# Patient Record
Sex: MALE | Race: BLACK OR AFRICAN AMERICAN | Employment: FULL TIME | URBAN - METROPOLITAN AREA
[De-identification: names, ages, dates, MRNs, and addresses within clinical notes are randomized per-mention and may not be internally consistent; named-entity substitution may affect disease eponyms.]

---

## 2018-02-24 ENCOUNTER — APPOINTMENT (OUTPATIENT)
Dept: CT IMAGING | Age: 43
End: 2018-02-24
Attending: EMERGENCY MEDICINE
Payer: SELF-PAY

## 2018-02-24 ENCOUNTER — HOSPITAL ENCOUNTER (EMERGENCY)
Age: 43
Discharge: HOME | End: 2018-02-24
Attending: EMERGENCY MEDICINE
Payer: SELF-PAY

## 2018-02-24 ENCOUNTER — APPOINTMENT (OUTPATIENT)
Dept: GENERAL RADIOLOGY | Age: 43
End: 2018-02-24
Attending: EMERGENCY MEDICINE
Payer: SELF-PAY

## 2018-02-24 VITALS
OXYGEN SATURATION: 97 % | RESPIRATION RATE: 16 BRPM | HEART RATE: 102 BPM | TEMPERATURE: 98 F | SYSTOLIC BLOOD PRESSURE: 126 MMHG | DIASTOLIC BLOOD PRESSURE: 72 MMHG

## 2018-02-24 DIAGNOSIS — S09.90XA INJURY OF HEAD, INITIAL ENCOUNTER: Primary | ICD-10-CM

## 2018-02-24 PROCEDURE — 73600 X-RAY EXAM OF ANKLE: CPT

## 2018-02-24 PROCEDURE — 75810000293 HC SIMP/SUPERF WND  RPR

## 2018-02-24 PROCEDURE — 70450 CT HEAD/BRAIN W/O DYE: CPT

## 2018-02-24 PROCEDURE — 77030031132 HC SUT NYL COVD -A

## 2018-02-24 PROCEDURE — 99283 EMERGENCY DEPT VISIT LOW MDM: CPT

## 2018-02-24 NOTE — DISCHARGE INSTRUCTIONS
Learning About a Closed Head Injury  What is a closed head injury? A closed head injury happens when your head gets hit hard. The strong force of the blow causes your brain to shake in your skull. This movement can cause the brain to bruise, swell, or tear. Sometimes nerves or blood vessels also get damaged. This can cause bleeding in or around the brain. A concussion is a type of closed head injury. What are the symptoms? If you have a mild concussion, you may have a mild headache or feel \"not quite right. \" These symptoms are common. They usually go away over a few days to 4 weeks. But sometimes after a concussion, you feel like you can't function as well as before the injury. And you have new symptoms. This is called postconcussive syndrome. You may:  · Find it harder to solve problems, think, concentrate, or remember. · Have headaches. · Have changes in your sleep patterns, such as not being able to sleep or sleeping all the time. · Have changes in your personality. · Not be interested in your usual activities. · Feel angry or anxious without a clear reason. · Lose your sense of taste or smell. · Be dizzy, lightheaded, or unsteady. It may be hard to stand or walk. How is a closed head injury treated? Any person who may have a concussion needs to see a doctor. Some people have to stay in the hospital to be watched. Others can go home safely. If you go home, follow your doctor's instructions. He or she will tell you if you need someone to watch you closely for the next 24 hours or longer. Rest is the best treatment. Get plenty of sleep at night. And try to rest during the day. · Avoid activities that are physically or mentally demanding. These include housework, exercise, and schoolwork. And don't play video games, send text messages, or use the computer. You may need to change your school or work schedule to be able to avoid these activities.   · Ask your doctor when it's okay to drive, ride a bike, or operate machinery. · Take an over-the-counter pain medicine, such as acetaminophen (Tylenol), ibuprofen (Advil, Motrin), or naproxen (Aleve). Be safe with medicines. Read and follow all instructions on the label. · Check with your doctor before you use any other medicines for pain. · Do not drink alcohol or use illegal drugs. They can slow recovery. They can also increase your risk of getting a second head injury. Follow-up care is a key part of your treatment and safety. Be sure to make and go to all appointments, and call your doctor if you are having problems. It's also a good idea to know your test results and keep a list of the medicines you take. Where can you learn more? Go to http://akhil-pk.info/. Enter E235 in the search box to learn more about \"Learning About a Closed Head Injury. \"  Current as of: October 14, 2016  Content Version: 11.4  © 2171-6989 [employer]wise, Incorporated. Care instructions adapted under license by [employer] (which disclaims liability or warranty for this information). If you have questions about a medical condition or this instruction, always ask your healthcare professional. [address] any warranty or liability for your use of this information.
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
## 2018-09-03 ENCOUNTER — HOSPITAL ENCOUNTER (EMERGENCY)
Age: 43
Discharge: HOME | End: 2018-09-03
Attending: EMERGENCY MEDICINE
Payer: SELF-PAY

## 2018-09-03 VITALS
TEMPERATURE: 99.2 F | OXYGEN SATURATION: 98 % | DIASTOLIC BLOOD PRESSURE: 89 MMHG | SYSTOLIC BLOOD PRESSURE: 141 MMHG | BODY MASS INDEX: 28.93 KG/M2 | HEIGHT: 78 IN | WEIGHT: 250 LBS | HEART RATE: 89 BPM | RESPIRATION RATE: 20 BRPM

## 2018-09-03 DIAGNOSIS — Z48.02 ENCOUNTER FOR REMOVAL OF SUTURES: Primary | ICD-10-CM

## 2018-09-03 PROCEDURE — 75810000275 HC EMERGENCY DEPT VISIT NO LEVEL OF CARE

## 2018-09-03 NOTE — DISCHARGE INSTRUCTIONS
Learning About Stitches and Staples Removal  When are stitches and staples removed? Your doctor will tell you when to have your stitches or staples removed, usually in 7 to 14 days. How long you'll be told to wait will depend on things like where the wound is located, how big and how deep the wound is, and what your general health is like. Do not remove the stitches on your own. Stitches on the face are usually removed within a week. But stitches and staples on other areas of the body, such as on the back or belly or over a joint, may need to stay in place longer, often a week or two. Be sure to follow your doctor's instructions. How are stitches and staples removed? It usually doesn't hurt when the doctor removes the stitches or staples. You may feel a tug as each stitch or staple is removed. · You will either be seated or lying down. · To remove stitches, the doctor will use scissors to cut each of the knots and then pull the threads out. · To remove staples, the doctor will use a tool to take out the staples one at a time. · The area may still feel tender after the stitches or staples are gone. But it should feel better within a few minutes or up to a few hours. What can you expect after stitches and staples are removed? Depending on the type and location of the cut, you will have a scar. Scars usually fade over time. Keep the area clean, but you won't need a bandage. When should you call for help? Call your doctor now or seek immediate medical care if :  · You have new pain, or your pain gets worse. · You have trouble moving the area near the scar. · You have symptoms of infection, such as:  ¨ Increased pain, swelling, warmth, or redness around the scar. ¨ Red streaks leading from the scar. ¨ Pus draining from the scar. ¨ A fever. Watch closely for changes in your health, and be sure to contact your doctor if:  · The scar opens. · You do not get better as expected.   Follow-up care is a key part of your treatment and safety. Be sure to make and go to all appointments, and call your doctor if you do not get better as expected. It's also a good idea to keep a list of the medicines you take. Where can you learn more? Go to http://akhil-pk.info/. Enter W211 in the search box to learn more about \"Learning About Stitches and Staples Removal.\"  Current as of: November 20, 2017  Content Version: 11.7  © 9339-0963 [employer]. Care instructions adapted under license by [employer] (which disclaims liability or warranty for this information). If you have questions about a medical condition or this instruction, always ask your healthcare professional. [address] any warranty or liability for your use of this information.

## 2018-09-03 NOTE — ED PROVIDER NOTES
EMERGENCY DEPARTMENT HISTORY AND PHYSICAL EXAM 
 
11:54 AM 
 
 
Date: 9/3/2018 Patient Name: Sandra Day History of Presenting Illness Chief Complaint Patient presents with  Suture Removal  
  L scalp History Provided By: Patient Chief Complaint: Suture Removal 
Duration: 6 Months Location: Left side of scalp Severity: Mild Modifying Factors: No modifying or aggravating factors were reported. Associated Symptoms: denies any other associated signs or symptoms Additional History (Context): Sandra Day is a 41 y.o. male with No significant past medical history who presents requesting a suture removal of sutures located on the left side his scalp of mild severity. Patient says that sutures were supposed to be removed x 6 months ago, but he never came to get them removed because he got a new job. He is not sure how many were placed, he thinks maybe 3 or 4. No modifying or aggravating factors were reported. Denies any further complaints or symptoms at the moment. PCP: None Current Outpatient Prescriptions Medication Sig Dispense Refill  methocarbamol (ROBAXIN-750) 750 mg tablet Take 1 Tab by mouth four (4) times daily. 20 Tab 0 Past History Past Medical History: 
History reviewed. No pertinent past medical history. Past Surgical History: 
History reviewed. No pertinent surgical history. Family History: 
Family History Problem Relation Age of Onset  Diabetes Mother  Diabetes Sister  Diabetes Brother Social History: 
Social History Substance Use Topics  Smoking status: Current Every Day Smoker Packs/day: 0.50  Smokeless tobacco: Current User  Alcohol use 9.6 oz/week 16 Cans of beer per week Allergies: 
No Known Allergies Review of Systems Review of Systems Constitutional: Negative for chills, diaphoresis and fever. HENT: Negative for congestion and sore throat. Eyes: Negative for pain and itching. Respiratory: Negative for cough and shortness of breath. Cardiovascular: Negative for chest pain and palpitations. Gastrointestinal: Negative for abdominal pain and diarrhea. Endocrine: Negative for polydipsia and polyuria. Genitourinary: Negative for dysuria and hematuria. Musculoskeletal: Negative for arthralgias, myalgias and neck pain. Skin:  
     Positive for needing suture removal  
Neurological: Negative for seizures and syncope. Hematological: Does not bruise/bleed easily. Psychiatric/Behavioral: Negative for agitation and hallucinations. All other systems reviewed and are negative. Physical Exam  
 
Visit Vitals  /89 (BP 1 Location: Left arm, BP Patient Position: At rest;Sitting)  Pulse 89  Temp 99.2 °F (37.3 °C)  Resp 20  
 Ht 6' 7\" (2.007 m)  Wt 113.4 kg (250 lb)  SpO2 98%  BMI 28.16 kg/m2 Physical Exam  
Constitutional: He is oriented to person, place, and time. He appears well-developed and well-nourished. No distress. NAD, well hydrated, non toxic HENT:  
Head: Normocephalic and atraumatic. Nose: Nose normal.  
Mouth/Throat: Oropharynx is clear and moist. No oropharyngeal exudate. Eyes: Conjunctivae and EOM are normal. Pupils are equal, round, and reactive to light. Neck: Normal range of motion. Neck supple. Cardiovascular: Normal rate and regular rhythm. Pulmonary/Chest: Effort normal.  
Abdominal: Soft. He exhibits no distension. There is no tenderness. There is no guarding. Musculoskeletal: Normal range of motion. Lymphadenopathy:  
  He has no cervical adenopathy. Neurological: He is alert and oriented to person, place, and time. No cranial nerve deficit. Coordination normal.  
Skin: Skin is warm. No rash noted. He is not diaphoretic. Psychiatric: He has a normal mood and affect. His behavior is normal.  
Nursing note and vitals reviewed. Diagnostic Study Results Labs - No results found for this or any previous visit (from the past 12 hour(s)). Radiologic Studies - No orders to display Medical Decision Making I am the first provider for this patient. I reviewed the vital signs, available nursing notes, past medical history, past surgical history, family history and social history. Vital Signs-Reviewed the patient's vital signs. Pulse Oximetry Analysis -  98% on room air (Normal) Records Reviewed: Nursing Notes (Time of Review: 11:54 AM) ED Course: Progress Notes, Reevaluation, and Consults: 
 
 
Provider Notes (Medical Decision Making): Two sutures were removed from patient's left scalp without complication. Procedures:  
 
Diagnosis Clinical Impression: 1. Encounter for removal of sutures Disposition: Discharge Follow-up Information Follow up With Details Comments Contact Info Minda Chavarria [address] [place] [id number] 
[phone] [facility]   [address] [id number] 
[phone] Patient's Medications Start Taking No medications on file Continue Taking METHOCARBAMOL (ROBAXIN-750) 750 MG TABLET    Take 1 Tab by mouth four (4) times daily. These Medications have changed No medications on file Stop Taking No medications on file  
 
_______________________________ Attestations: 
Scribe Attestation Sandi De La Cruz acting as a scribe for and in the presence of Yuliya Corado September 03, 2018 at 11:54 AM 
    
Provider Attestation:     
I personally performed the services described in the documentation, reviewed the documentation, as recorded by the scribe in my presence, and it accurately and completely records my words and actions. September 03, 2018 at 11:54 AM - CHIQUITA Corado   
_______________________________

## 2018-09-03 NOTE — LETTER
NOTIFICATION RETURN TO WORK / SCHOOL 
 
9/3/2018 12:06 PM 
 
Mr. Mariela Lacey [address] [facility] [address] To Whom It May Concern: 
 
Mariela Lacey is currently under the care of [facility]. He will return to work/school on: 9/4/18 If there are questions or concerns please have the patient contact our office. Sincerely, ERNESTINE CesarC

## 2019-05-25 ENCOUNTER — HOSPITAL ENCOUNTER (EMERGENCY)
Age: 44
Discharge: HOME | End: 2019-05-26
Attending: EMERGENCY MEDICINE
Payer: SELF-PAY

## 2019-05-25 ENCOUNTER — APPOINTMENT (OUTPATIENT)
Dept: GENERAL RADIOLOGY | Age: 44
End: 2019-05-25
Attending: EMERGENCY MEDICINE
Payer: SELF-PAY

## 2019-05-25 VITALS
HEART RATE: 110 BPM | DIASTOLIC BLOOD PRESSURE: 86 MMHG | SYSTOLIC BLOOD PRESSURE: 139 MMHG | OXYGEN SATURATION: 98 % | RESPIRATION RATE: 20 BRPM | TEMPERATURE: 99.2 F

## 2019-05-25 DIAGNOSIS — S46.911A SHOULDER STRAIN, RIGHT, INITIAL ENCOUNTER: ICD-10-CM

## 2019-05-25 DIAGNOSIS — S09.90XA CLOSED HEAD INJURY, INITIAL ENCOUNTER: Primary | ICD-10-CM

## 2019-05-25 PROCEDURE — 99284 EMERGENCY DEPT VISIT MOD MDM: CPT

## 2019-05-25 PROCEDURE — 73030 X-RAY EXAM OF SHOULDER: CPT

## 2019-05-25 NOTE — LETTER
NOTIFICATION OF RETURN TO WORK / SCHOOL 
 
5/26/2019 6:51 AM 
 
Mr. Parris Geronimo [address] [facility] [address] [address] To Whom It May Concern: 
 
Parris Geronimo was under the care of [facility] EMERGENCY DEPT He will be able to return to work/school on 05/10/2019. If there are questions or concerns please have the patient contact our office. Sincerely, Maki Lozano RN

## 2019-05-25 NOTE — LETTER
NOTIFICATION RETURN TO WORK / SCHOOL 
 
5/26/2019 7:01 AM 
 
Mr. Mabel West [address] [facility] [address] To Whom It May Concern: 
 
Mabel West is currently under the care of [facility] EMERGENCY DEPT. He will return to work/school on: 5/29/19 If there are questions or concerns please have the patient contact our office.  
 
 
 
Sincerely,

## 2019-05-26 ENCOUNTER — APPOINTMENT (OUTPATIENT)
Dept: GENERAL RADIOLOGY | Age: 44
End: 2019-05-26
Attending: EMERGENCY MEDICINE
Payer: SELF-PAY

## 2019-05-26 RX ORDER — NAPROXEN 500 MG/1
500 TABLET ORAL 2 TIMES DAILY WITH MEALS
Qty: 20 TAB | Refills: 0 | Status: SHIPPED | OUTPATIENT
Start: 2019-05-26 | End: 2019-06-05

## 2019-05-26 NOTE — DISCHARGE INSTRUCTIONS
Patient Education        Learning About a Closed Head Injury  What is a closed head injury? A closed head injury happens when your head gets hit hard. The strong force of the blow causes your brain to shake in your skull. This movement can cause the brain to bruise, swell, or tear. Sometimes nerves or blood vessels also get damaged. This can cause bleeding in or around the brain. A concussion is a type of closed head injury. What are the symptoms? If you have a mild concussion, you may have a mild headache or feel \"not quite right. \" These symptoms are common. They usually go away over a few days to 4 weeks. But sometimes after a concussion, you feel like you can't function as well as before the injury. And you have new symptoms. This is called postconcussive syndrome. You may:  · Find it harder to solve problems, think, concentrate, or remember. · Have headaches. · Have changes in your sleep patterns, such as not being able to sleep or sleeping all the time. · Have changes in your personality. · Not be interested in your usual activities. · Feel angry or anxious without a clear reason. · Lose your sense of taste or smell. · Be dizzy, lightheaded, or unsteady. It may be hard to stand or walk. How is a closed head injury treated? Any person who may have a concussion needs to see a doctor. Some people have to stay in the hospital to be watched. Others can go home safely. If you go home, follow your doctor's instructions. He or she will tell you if you need someone to watch you closely for the next 24 hours or longer. Rest is the best treatment. Get plenty of sleep at night. And try to rest during the day. · Avoid activities that are physically or mentally demanding. These include housework, exercise, and schoolwork. And don't play video games, send text messages, or use the computer. You may need to change your school or work schedule to be able to avoid these activities.   · Ask your doctor when it's okay to drive, ride a bike, or operate machinery. · Take an over-the-counter pain medicine, such as acetaminophen (Tylenol), ibuprofen (Advil, Motrin), or naproxen (Aleve). Be safe with medicines. Read and follow all instructions on the label. · Check with your doctor before you use any other medicines for pain. · Do not drink alcohol or use illegal drugs. They can slow recovery. They can also increase your risk of getting a second head injury. Follow-up care is a key part of your treatment and safety. Be sure to make and go to all appointments, and call your doctor if you are having problems. It's also a good idea to know your test results and keep a list of the medicines you take. Where can you learn more? Go to http://akhil-pk.info/. Enter E235 in the search box to learn more about \"Learning About a Closed Head Injury. \"  Current as of: Leonila 3, 2018  Content Version: 11.9  © 1278-7515 [employer]. Care instructions adapted under license by [employer] (which disclaims liability or warranty for this information). If you have questions about a medical condition or this instruction, always ask your healthcare professional. [address] any warranty or liability for your use of this information.
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
## 2019-09-27 ENCOUNTER — APPOINTMENT (OUTPATIENT)
Dept: GENERAL RADIOLOGY | Age: 44
End: 2019-09-27
Attending: EMERGENCY MEDICINE
Payer: SELF-PAY

## 2019-09-27 ENCOUNTER — HOSPITAL ENCOUNTER (EMERGENCY)
Age: 44
Discharge: HOME | End: 2019-09-27
Attending: EMERGENCY MEDICINE
Payer: SELF-PAY

## 2019-09-27 VITALS
SYSTOLIC BLOOD PRESSURE: 130 MMHG | TEMPERATURE: 97.9 F | BODY MASS INDEX: 28.93 KG/M2 | HEIGHT: 78 IN | RESPIRATION RATE: 18 BRPM | DIASTOLIC BLOOD PRESSURE: 78 MMHG | HEART RATE: 93 BPM | OXYGEN SATURATION: 98 % | WEIGHT: 250 LBS

## 2019-09-27 DIAGNOSIS — S80.11XA CONTUSION OF RIGHT CALF, INITIAL ENCOUNTER: ICD-10-CM

## 2019-09-27 DIAGNOSIS — S16.1XXA STRAIN OF NECK MUSCLE, INITIAL ENCOUNTER: Primary | ICD-10-CM
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
## 2019-09-27 DIAGNOSIS — S39.012A STRAIN OF LUMBAR REGION, INITIAL ENCOUNTER: ICD-10-CM

## 2019-09-27 PROCEDURE — 99283 EMERGENCY DEPT VISIT LOW MDM: CPT

## 2019-09-27 PROCEDURE — 72100 X-RAY EXAM L-S SPINE 2/3 VWS: CPT

## 2019-09-27 RX ORDER — OXYCODONE AND ACETAMINOPHEN 5; 325 MG/1; MG/1
1 TABLET ORAL
Qty: 8 TAB | Refills: 0 | Status: SHIPPED | OUTPATIENT
Start: 2019-09-27 | End: 2019-09-30

## 2019-09-27 RX ORDER — METAXALONE 800 MG/1
800 TABLET ORAL 4 TIMES DAILY
Qty: 20 TAB | Refills: 0 | Status: SHIPPED | OUTPATIENT
Start: 2019-09-27 | End: 2019-10-02

## 2019-09-27 NOTE — LETTER
NOTIFICATION RETURN TO WORK / SCHOOL 
 
9/27/2019 2:14 PM 
 
Mr. Zuly Khan [address] [facility] [address] To Whom It May Concern: 
 
Zuly Khan is currently under the care of [facility] EMERGENCY DEPT. He will return to work/school on: 09/30/2019 If there are questions or concerns please have the patient contact our office. Sincerely, Katarzyna Coleman RN
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
## 2019-09-27 NOTE — ED NOTES
CHIQUITA Bowman has reviewed discharge instructions with the patient. The patient verbalized understanding. Discharge medications reviewed with patient and appropriate educational materials and side effects teaching were provided. Patient was given the opportunity to ask questions and he stated that he did not have any at this time.

## 2019-09-27 NOTE — DISCHARGE INSTRUCTIONS
Patient Education        Back Strain: Care Instructions  Overview    A back strain happens when you overstretch, or pull, a muscle in your back. You may hurt your back in an accident or when you exercise or lift something. Sometimes you may not know how you hurt your back. Most back pain will get better with rest and time. You can take care of yourself at home to help your back heal.  Follow-up care is a key part of your treatment and safety. Be sure to make and go to all appointments, and call your doctor if you are having problems. It's also a good idea to know your test results and keep a list of the medicines you take. How can you care for yourself at home? · Try to stay as active as you can, but stop or reduce any activity that causes pain. · Put ice or a cold pack on the sore muscle for 10 to 20 minutes at a time to stop swelling. Try this every 1 to 2 hours for 3 days (when you are awake) or until the swelling goes down. Put a thin cloth between the ice pack and your skin. · After 2 or 3 days, apply a heating pad on low or a warm cloth to your back. Some doctors suggest that you go back and forth between hot and cold treatments. · Take pain medicines exactly as directed. ? If the doctor gave you a prescription medicine for pain, take it as prescribed. ? If you are not taking a prescription pain medicine, ask your doctor if you can take an over-the-counter medicine. · Try sleeping on your side with a pillow between your legs. Or put a pillow under your knees when you lie on your back. These measures can ease pain in your lower back. · Return to your usual level of activity slowly. When should you call for help? Call 911 anytime you think you may need emergency care. For example, call if:    · You are unable to move a leg at all.   [facility] your doctor now or seek immediate medical care if:    · You have new or worse symptoms in your legs, belly, or buttocks.  Symptoms may include:  ? Numbness or tingling. ? Weakness. ? Pain.     · You lose bladder or bowel control.    Watch closely for changes in your health, and be sure to contact your doctor if:    · You have a fever, lose weight, or don't feel well.     · You are not getting better as expected. Where can you learn more? Go to http://akhil-pk.info/. Enter I743 in the search box to learn more about \"Back Strain: Care Instructions. \"  Current as of: June 26, 2019  Content Version: 12.2  © 1193-0229 [employer]. Care instructions adapted under license by [employer] (which disclaims liability or warranty for this information). If you have questions about a medical condition or this instruction, always ask your healthcare professional. [address] any warranty or liability for your use of this information. Patient Education     Contusion: Care Instructions  Your Care Instructions  Contusion is the medical term for a bruise. It is the result of a direct blow or an impact, such as a fall. Contusions are common sports injuries. Most people think of a bruise as a black-and-blue spot. This happens when small blood vessels get torn and leak blood under the skin. But bones, muscles, and organs can also get bruised. This may damage deep tissues but not cause a bruise you can see. The doctor will do a physical exam to find the location of your contusion. You may also have tests to make sure you do not have a more serious injury, such as a broken bone or nerve damage. These may include X-rays or other imaging tests like a CT scan or MRI. Deep-tissue contusions may cause pain and swelling. But if there is no serious damage, they will often get better in a few weeks with home treatment. The doctor has checked you carefully, but problems can develop later. If you notice any problems or new symptoms, get medical treatment right away.   Follow-up care is a key part of your treatment and safety. Be sure to make and go to all appointments, and call your doctor if you are having problems. It's also a good idea to know your test results and keep a list of the medicines you take. How can you care for yourself at home? · Put ice or a cold pack on the sore area for 10 to 20 minutes at a time to stop swelling. Put a thin cloth between the ice pack and your skin. · Be safe with medicines. Read and follow all instructions on the label. ¨ If the doctor gave you a prescription medicine for pain, take it as prescribed. ¨ If you are not taking a prescription pain medicine, ask your doctor if you can take an over-the-counter medicine. · If you can, prop up the sore area on pillows as much as possible for the next few days. Try to keep the sore area above the level of your heart. When should you call for help? Call your doctor now or seek immediate medical care if:  · Your pain gets worse. · You have new or worse swelling. · You have tingling, weakness, or numbness in the area near the contusion. · The area near the contusion is cold or pale. Watch closely for changes in your health, and be sure to contact your doctor if:  · You do not get better as expected. Where can you learn more? Go to "[employer].be  Enter [id number] in the search box to learn more about \"Contusion: Care Instructions. \"   © 1761-7508 [employer]. Care instructions adapted under license by [facility] (which disclaims liability or warranty for this information). This care instruction is for use with your licensed healthcare professional. If you have questions about a medical condition or this instruction, always ask your healthcare professional. [place] [address] any warranty or liability for your use of this information.   Content Version: 66.0.316045; Current as of: May 22, 2015           Patient Education        Neck Strain: Care Instructions  Your Care Instructions    You have strained the muscles and ligaments in your neck. A sudden, awkward movement can strain the neck. This often occurs with falls or car accidents or during certain sports. Everyday activities like working on a computer or sleeping can also cause neck strain if they force you to hold your neck in an awkward position for a long time. It is common for neck pain to get worse for a day or two after an injury, but it should start to feel better after that. You may have more pain and stiffness for several days before it gets better. This is expected. It may take a few weeks or longer for it to heal completely. Good home treatment can help you get better faster and avoid future neck problems. Follow-up care is a key part of your treatment and safety. Be sure to make and go to all appointments, and call your doctor if you are having problems. It's also a good idea to know your test results and keep a list of the medicines you take. How can you care for yourself at home? · If you were given a neck brace (cervical collar) to limit neck motion, wear it as instructed for as many days as your doctor tells you to. Do not wear it longer than you were told to. Wearing a brace for too long can make neck stiffness worse and weaken the neck muscles. · You can try using heat or ice to see if it helps. ? Try using a heating pad on a low or medium setting for 15 to 20 minutes every 2 to 3 hours. Try a warm shower in place of one session with the heating pad. You can also buy single-use heat wraps that last up to 8 hours. ? You can also try an ice pack for 10 to 15 minutes every 2 to 3 hours. · Take pain medicines exactly as directed. ? If the doctor gave you a prescription medicine for pain, take it as prescribed. ? If you are not taking a prescription pain medicine, ask your doctor if you can take an over-the-counter medicine. · Gently rub the area to relieve pain and help with blood flow.  Do not massage the area if it hurts to do so.  · Do not do anything that makes the pain worse. Take it easy for a couple of days. You can do your usual activities if they do not hurt your neck or put it at risk for more stress or injury. · Try sleeping on a special neck pillow. Place it under your neck, not under your head. Placing a tightly rolled-up towel under your neck while you sleep will also work. If you use a neck pillow or rolled towel, do not use your regular pillow at the same time. · To prevent future neck pain, do exercises to stretch and strengthen your neck and back. Learn how to use good posture, safe lifting techniques, and proper body mechanics. When should you call for help? Call 911 anytime you think you may need emergency care. For example, call if:    · You are unable to move an arm or a leg at all.   [facility] your doctor now or seek immediate medical care if:    · You have new or worse symptoms in your arms, legs, chest, belly, or buttocks. Symptoms may include:  ? Numbness or tingling. ? Weakness. ? Pain.     · You lose bladder or bowel control.    Watch closely for changes in your health, and be sure to contact your doctor if:    · You are not getting better as expected. Where can you learn more? Go to http://akhil-pk.info/. Enter M253 in the search box to learn more about \"Neck Strain: Care Instructions. \"  Current as of: June 26, 2019  Content Version: 12.2  © 3213-2440 National Institutes of Health (NIH). Care instructions adapted under license by [employer] (which disclaims liability or warranty for this information). If you have questions about a medical condition or this instruction, always ask your healthcare professional. [place] [address] any warranty or liability for your use of this information.
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
## 2019-10-25 ENCOUNTER — HOSPITAL ENCOUNTER (EMERGENCY)
Age: 44
Discharge: HOME | End: 2019-10-25
Attending: EMERGENCY MEDICINE
Payer: SELF-PAY

## 2019-10-25 ENCOUNTER — HOSPITAL ENCOUNTER (EMERGENCY)
Age: 44
Discharge: PSYCHIATRIC OR SUBSTANCE USE FACILITY | End: 2019-10-26
Attending: EMERGENCY MEDICINE
Payer: SELF-PAY

## 2019-10-25 ENCOUNTER — APPOINTMENT (OUTPATIENT)
Dept: GENERAL RADIOLOGY | Age: 44
End: 2019-10-25
Attending: EMERGENCY MEDICINE
Payer: SELF-PAY

## 2019-10-25 VITALS
HEART RATE: 85 BPM | TEMPERATURE: 97 F | DIASTOLIC BLOOD PRESSURE: 74 MMHG | RESPIRATION RATE: 16 BRPM | SYSTOLIC BLOOD PRESSURE: 114 MMHG | OXYGEN SATURATION: 99 %

## 2019-10-25 DIAGNOSIS — F10.10 ALCOHOL ABUSE: ICD-10-CM

## 2019-10-25 DIAGNOSIS — S22.41XA CLOSED FRACTURE OF MULTIPLE RIBS OF RIGHT SIDE, INITIAL ENCOUNTER: Primary | ICD-10-CM

## 2019-10-25 DIAGNOSIS — R45.851 SUICIDAL IDEATION: Primary | ICD-10-CM

## 2019-10-25 LAB — ETHANOL SERPL-MCNC: <3 MG/DL (ref 0–3)

## 2019-10-25 PROCEDURE — 99284 EMERGENCY DEPT VISIT MOD MDM: CPT

## 2019-10-25 PROCEDURE — 71101 X-RAY EXAM UNILAT RIBS/CHEST: CPT

## 2019-10-25 PROCEDURE — 80307 DRUG TEST PRSMV CHEM ANLYZR: CPT

## 2019-10-25 PROCEDURE — 96374 THER/PROPH/DIAG INJ IV PUSH: CPT

## 2019-10-25 PROCEDURE — 74011250637 HC RX REV CODE- 250/637: Performed by: EMERGENCY MEDICINE

## 2019-10-25 PROCEDURE — 74011250636 HC RX REV CODE- 250/636: Performed by: EMERGENCY MEDICINE

## 2019-10-25 RX ORDER — OXYCODONE AND ACETAMINOPHEN 5; 325 MG/1; MG/1
2 TABLET ORAL
Status: COMPLETED | OUTPATIENT
Start: 2019-10-25 | End: 2019-10-25

## 2019-10-25 RX ORDER — KETOROLAC TROMETHAMINE 15 MG/ML
15 INJECTION, SOLUTION INTRAMUSCULAR; INTRAVENOUS
Status: COMPLETED | OUTPATIENT
Start: 2019-10-25 | End: 2019-10-25

## 2019-10-25 RX ORDER — OXYCODONE AND ACETAMINOPHEN 5; 325 MG/1; MG/1
1 TABLET ORAL
Qty: 10 TAB | Refills: 0 | Status: SHIPPED | OUTPATIENT
Start: 2019-10-25 | End: 2019-10-28

## 2019-10-25 RX ADMIN — OXYCODONE HYDROCHLORIDE AND ACETAMINOPHEN 2 TABLET: 5; 325 TABLET ORAL at 09:13

## 2019-10-25 RX ADMIN — KETOROLAC TROMETHAMINE 15 MG: 15 INJECTION, SOLUTION INTRAMUSCULAR; INTRAVENOUS at 22:48

## 2019-10-25 NOTE — ED PROVIDER NOTES
EMERGENCY DEPARTMENT HISTORY AND PHYSICAL EXAM    6:13 PM      Date: 10/25/2019  Patient Name: Jahaira Harris    History of Presenting Illness     Chief Complaint   Patient presents with    Mental Health Problem         History Provided By: Patient      Additional History (Context): Jahaira Harris is a 45 y.o. male with History of alcohol abuse, history of marrow needs a day earlier for rib fractures after traumatic fall while intoxicated who presents with suicidal ideations. Patient feels that the whole world is crumbling around him, he has no reason to live anymore, he has a plan to jump in front of traffic to end his life. He has felt this way in the past, try to take his life very remotely, was hospitalized at that time but has been okay for some while. Denies any other new symptoms except for pain in the ribs where he knows he has rib fractures    PCP: None    Current Outpatient Medications   Medication Sig Dispense Refill    oxyCODONE-acetaminophen (PERCOCET) 5-325 mg per tablet Take 1 Tab by mouth every six (6) hours as needed for Pain for up to 3 days. Max Daily Amount: 4 Tabs. Take 1 tablet every 6 hours as needed for pain control. 10 Tab 0    methocarbamol (ROBAXIN-750) 750 mg tablet Take 1 Tab by mouth four (4) times daily. 20 Tab 0       Past History     Past Medical History:  No past medical history on file. Past Surgical History:  No past surgical history on file. Family History:  Family History   Problem Relation Age of Onset    Diabetes Mother     Diabetes Sister     Diabetes Brother        Social History:  Social History     Tobacco Use    Smoking status: Current Every Day Smoker     Packs/day: 0.50    Smokeless tobacco: Current User   Substance Use Topics    Alcohol use:  Yes     Alcohol/week: 16.0 standard drinks     Types: 16 Cans of beer per week    Drug use: Yes     Types: Marijuana       Allergies:  No Known Allergies      Review of Systems       Review of Systems Constitutional: Negative. Negative for activity change, chills and fever. HENT: Negative. Negative for ear pain, hearing loss and sore throat. Eyes: Negative. Negative for pain and visual disturbance. Respiratory: Negative. Negative for cough, chest tightness and shortness of breath. Cardiovascular: Negative. Negative for chest pain and leg swelling. Gastrointestinal: Negative. Negative for abdominal distention and abdominal pain. Genitourinary: Negative. Negative for dysuria and hematuria. Musculoskeletal: Negative. Skin: Negative. Negative for rash. Neurological: Negative. Negative for dizziness and headaches. Psychiatric/Behavioral: Positive for dysphoric mood and suicidal ideas. Negative for agitation and behavioral problems. Physical Exam   There were no vitals taken for this visit. Physical Exam   Constitutional: He is oriented to person, place, and time. He appears well-developed and well-nourished. HENT:   Head: Normocephalic and atraumatic. Eyes: Pupils are equal, round, and reactive to light. EOM are normal. Right eye exhibits no discharge. Left eye exhibits no discharge. Neck: Normal range of motion. Neck supple. No JVD present. No tracheal deviation present. Cardiovascular: Normal rate, regular rhythm and normal heart sounds. No murmur heard. Pulmonary/Chest: Effort normal and breath sounds normal. No respiratory distress. He has no wheezes. He has no rales. He exhibits tenderness. Abdominal: Soft. Bowel sounds are normal. He exhibits no distension. There is no tenderness. There is no rebound. Musculoskeletal: Normal range of motion. He exhibits no tenderness or deformity. Neurological: He is alert and oriented to person, place, and time. No cranial nerve deficit. 5/5 strength UE/LE, 5/5 sensation UE/LE     Skin: Skin is warm and dry. No rash noted. No erythema. Psychiatric: He has a normal mood and affect.  His behavior is normal. Diagnostic Study Results     Labs -  No results found for this or any previous visit (from the past 12 hour(s)). Radiologic Studies -   No orders to display         Medical Decision Making   I am the first provider for this patient. I reviewed the vital signs, available nursing notes, past medical history, past surgical history, family history and social history. Vital Signs-Reviewed the patient's vital signs. Records Reviewed: Nursing Notes and Old Medical Records      Provider Notes (Medical Decision Making):     MDM  Number of Diagnoses or Management Options  Suicidal ideation:   Diagnosis management comments: Differential Diagnosis: Suicidal ideation, alcohol intoxication alcohol withdrawal, bipolar, dysphoric mood    Patient presenting after being seen this morning with rib fractures after he fell down drunk the night before, no longer clinically intoxicated, will send ethyl alcohol and UDS the patient denies any drugs he smokes marijuana no other drugs. He is high risk since he clearly abuses substances, he says he has a plan, has prior attempts, will call crisis for evaluation he is currently voluntary. Reevaluation: 6:15 PM : Pt care transferred to Dr. Courtney Strickland  ,ED provider. History of patient complaint(s), available diagnostic reports and current treatment plan has been discussed thoroughly. Bedside rounding on patient occured : yes . Intended disposition of patient : TBD  Pending diagnostics reports and/or labs (please list): uds, etoh, crisis eval           Diagnosis     Clinical Impression:   1. Suicidal ideation        Disposition: tbd    Follow-up Information    None          Patient's Medications   Start Taking    No medications on file   Continue Taking    METHOCARBAMOL (ROBAXIN-750) 750 MG TABLET    Take 1 Tab by mouth four (4) times daily.     OXYCODONE-ACETAMINOPHEN (PERCOCET) 5-325 MG PER TABLET    Take 1 Tab by mouth every six (6) hours as needed for Pain for up to 3 days. Max Daily Amount: 4 Tabs. Take 1 tablet every 6 hours as needed for pain control. These Medications have changed    No medications on file   Stop Taking    No medications on file     _______________________________    Please note that this dictation was completed with [email or web address], the computer voice recognition software. Quite often unanticipated grammatical, syntax, homophones, and other interpretive errors are inadvertently transcribed by the computer software. Please disregard these errors. Please excuse any errors that have escaped final proofreading.

## 2019-10-25 NOTE — ED PROVIDER NOTES
EMERGENCY DEPARTMENT HISTORY AND PHYSICAL EXAM    Date: 10/25/2019  Patient Name: Kiesha Del Cid    History of Presenting Illness     Chief Complaint   Patient presents with    Fall         History Provided By: Patient  Additional History (Context): Kiesha Del Cid is a 45 y.o. male with Alcohol and tobacco abuse who presents with a fall. Says that he was walking outside while drinking last night around 130 he must of stumbled fell and landed on the right side of his mid back. Said he went to bed after that. Hurts to now roll or touch. Denies any numbness weakness bowel incontinence fever IV drug use. Denies any pain with breathing or abdominal pain nausea vomiting. Would like to have assistance with alcohol detox referrals. Denies SI HI.    PCP: None    Current Outpatient Medications   Medication Sig Dispense Refill    oxyCODONE-acetaminophen (PERCOCET) 5-325 mg per tablet Take 1 Tab by mouth every six (6) hours as needed for Pain for up to 3 days. Max Daily Amount: 4 Tabs. Take 1 tablet every 6 hours as needed for pain control. 10 Tab 0    methocarbamol (ROBAXIN-750) 750 mg tablet Take 1 Tab by mouth four (4) times daily. 20 Tab 0       Past History     Past Medical History:  No past medical history on file. Past Surgical History:  No past surgical history on file. Family History:  Family History   Problem Relation Age of Onset    Diabetes Mother     Diabetes Sister     Diabetes Brother        Social History:  Social History     Tobacco Use    Smoking status: Current Every Day Smoker     Packs/day: 0.50    Smokeless tobacco: Current User   Substance Use Topics    Alcohol use: Yes     Alcohol/week: 16.0 standard drinks     Types: 16 Cans of beer per week    Drug use: Yes     Types: Marijuana       Allergies:  No Known Allergies      Review of Systems   Review of Systems   Constitutional: Negative for fever. HENT: Negative. Eyes: Negative.     Respiratory: Negative for shortness of breath. Gastrointestinal: Negative for abdominal pain. Endocrine: Negative. Genitourinary: Positive for flank pain. Skin: Negative. Allergic/Immunologic: Negative. Neurological: Negative. Hematological: Negative. Psychiatric/Behavioral: Negative. All Other Systems Negative  Physical Exam     Vitals:    10/25/19 0837 10/25/19 0845 10/25/19 0900 10/25/19 0944   BP:  135/84 114/74    Pulse: 93 92 94 85   Resp: 16 19 17 16   Temp:       SpO2: 94% 94% 97% 99%     Physical Exam   Constitutional: He is oriented to person, place, and time. Vital signs are normal. He appears well-developed and well-nourished. He is active. Non-toxic appearance. He does not appear ill. No distress. HENT:   Head: Normocephalic and atraumatic. Neck: Normal range of motion. Neck supple. Carotid bruit is not present. No tracheal deviation present. No thyromegaly present. Cardiovascular: Normal rate, regular rhythm and normal heart sounds. Exam reveals no gallop and no friction rub. No murmur heard. Pulmonary/Chest: Effort normal and breath sounds normal. No stridor. No respiratory distress. He has no wheezes. He has no rales. He exhibits tenderness. Right CVA tenderness approximately T9-10. Abdominal: Soft. He exhibits no distension and no mass. There is no tenderness. There is no rebound, no guarding and no CVA tenderness. Musculoskeletal: Normal range of motion. Neurological: He is alert and oriented to person, place, and time. No cranial nerve deficit. Coordination normal.   Skin: Skin is warm, dry and intact. He is not diaphoretic. No pallor. Psychiatric: He has a normal mood and affect. His speech is normal and behavior is normal. Judgment and thought content normal.   Nursing note and vitals reviewed. Diagnostic Study Results     Labs -   No results found for this or any previous visit (from the past 12 hour(s)).     Radiologic Studies -   XR RIBS RT W PA CXR MIN 3 V   Final Result IMPRESSION:      Acute displaced fracture of the posterior right 10th and 11th rib and   questionable nondisplaced fracture of the anterior right sixth rib. CT Results  (Last 48 hours)    None        CXR Results  (Last 48 hours)    None            Medical Decision Making   I am the first provider for this patient. I reviewed the vital signs, available nursing notes, past medical history, past surgical history, family history and social history. Vital Signs-Reviewed the patient's vital signs. Records Reviewed: Nursing Notes    Procedures:  Bedside US  Date/Time: 10/25/2019 9:20 AM  Performed by: CHIQUITA Mulligan  Authorized by: CHIQUITA Mulligan     Written consent obtained: No    Verbal consent obtained: Yes    Given by:  Patient  Performed by:  PA  Supervising provider:  Elise Wolf  Type of procedure:  FAST  Indications:  Blunt trauma  Hepatorenal:  Adequate  Perisplenic:  Adequate  Suprapubic:  Adequate  Pericardial:  Adequate  L thorax for lung sliding:  Adequate  Hepatorenal free fluid: absent    Perisplenic free fluid: absent    Suprapubic free fluid: absent    Right thoracic fluid: absent    Peritoneal free fluid: absent    Pericardial effusion: present    Right thoracic fluid:  Absent  Right lung pneumothorax: No    Left lung pneumothorax: No          Provider Notes (Medical Decision Making): pt tolerated PO well. Refer to [facility] for detox. Give incentive spirometer here. MED RECONCILIATION:  No current facility-administered medications for this encounter. Current Outpatient Medications   Medication Sig    oxyCODONE-acetaminophen (PERCOCET) 5-325 mg per tablet Take 1 Tab by mouth every six (6) hours as needed for Pain for up to 3 days. Max Daily Amount: 4 Tabs. Take 1 tablet every 6 hours as needed for pain control.  methocarbamol (ROBAXIN-750) 750 mg tablet Take 1 Tab by mouth four (4) times daily.        Disposition:  home    DISCHARGE NOTE:   11:08 AM    Pt has been reexamined. Patient has no new complaints, changes, or physical findings. Care plan outlined and precautions discussed. Results of x-rays were reviewed with the patient. All medications were reviewed with the patient; will d/c home with percocet. All of pt's questions and concerns were addressed. Patient was instructed and agrees to follow up with PCP, [facility], as well as to return to the ED upon further deterioration. Patient is ready to go home. Follow-up Information     Follow up With Specialties Details Why [address] [place] [address]  Schedule an appointment as soon as possible for a visit in 3 days for help with outpatient alcohol abuse detox [facility] [address]  [address] [facility]  Schedule an appointment as soon as possible for a visit in 3 days  [address] [phone]  [phone]    [facility] EMERGENCY DEPT Emergency Medicine  If symptoms worsen return immediately [address] [facility] [address]  [phone]          Current Discharge Medication List      START taking these medications    Details   oxyCODONE-acetaminophen (PERCOCET) 5-325 mg per tablet Take 1 Tab by mouth every six (6) hours as needed for Pain for up to 3 days. Max Daily Amount: 4 Tabs. Take 1 tablet every 6 hours as needed for pain control. Qty: 10 Tab, Refills: 0    Associated Diagnoses: Closed fracture of multiple ribs of right side, initial encounter             Diagnosis     Clinical Impression:   1. Closed fracture of multiple ribs of right side, initial encounter    2.  Alcohol abuse

## 2019-10-25 NOTE — LETTER
NOTIFICATION RETURN TO WORK / SCHOOL 
 
10/25/2019 11:21 AM 
 
Mr. Sarah Mcgee [address],  [address] [place] [address] To Whom It May Concern: 
 
Sarah Mcgee is currently under the care of [facility] EMERGENCY DEPT. He will return to work/school on: Monday 10/28/2019 Sarah Mcgee may return to work/school with the following restrictions: No restrictions. If there are questions or concerns please have the patient contact our office.  
 
 
 
Sincerely, 
 
 
Colleen YEHN, RN

## 2019-10-25 NOTE — ED NOTES
Patient is back from his visit this morning. He states that he is still not feeling well. He is now stating that he is SI and needs someone to talk to. He told the medics that his brother is refusing to allow him back in the house and needs someplace to stay for the next several days.
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
## 2019-10-25 NOTE — DISCHARGE INSTRUCTIONS
Patient Education        Learning About Alcohol Use Disorder  What is alcohol use disorder? Alcohol use disorder means that a person drinks alcohol even though it causes harm to themselves or others. It can range from mild to severe. The more signs of this disorder you have, the more severe it may be. Moderate to severe alcohol use disorder is sometimes called addiction. People who have it may find it hard to control their use of alcohol. People who have this disorder may argue with others about how much they're drinking. Their job may be affected because of drinking. They may drink when it's dangerous or illegal, such as when they drive. They also may have a strong need, or craving, to drink. They may feel like they must drink just to get by. Their drinking may increase their risk of getting hurt or being in a car crash. Over time, drinking too much alcohol may cause health problems. These may include high blood pressure, liver problems, or problems with digestion. What are the signs? Maybe you've wondered about your alcohol habits, or how to tell if your drinking is becoming a problem. Here are some of the signs of alcohol use disorder. You may have it if you have two or more of the following signs:  · You drink larger amounts of alcohol than you ever meant to. Or you've been drinking for a longer time than you ever meant to. · You can't cut down or control your use. Or you constantly wish you could cut down. · You spend a lot of time getting or drinking alcohol or recovering from its effects. · You have strong cravings for alcohol. · You can no longer do your main jobs at work, at school, or at home. · You keep drinking alcohol, even though your use hurts your relationships. · You have stopped doing important activities because of your alcohol use. · You drink alcohol in situations where doing so is dangerous. · You keep drinking alcohol even though you know it's causing health problems.   · You need more and more alcohol to get the same effect, or you get less effect from the same amount over time. This is called tolerance. · You have uncomfortable symptoms when you stop drinking alcohol or use less. This is called withdrawal.  Alcohol use disorder can range from mild to severe. The more signs you have, the more severe the disorder may be. Moderate to severe alcohol use disorder is sometimes called addiction. You might not realize that your drinking is a problem. You might not drink large amounts when you drink. Or you might go for days or weeks between drinking episodes. But even if you don't drink very often, your drinking could still be harmful and put you at risk. How is alcohol use disorder treated? Getting help is up to you. But you don't have to do it alone. There are many people and kinds of treatments that can help. Treatment for alcohol use disorder can include:  · Group therapy, one or more types of counseling, and alcohol education. · Medicines that help to:  ? Reduce withdrawal symptoms and help you safely stop drinking. ? Reduce cravings for alcohol. · Support groups. These groups include Alcoholics Anonymous and [place] (Self-Management and Recovery Training). Some people are able to stop or cut back on drinking with help from a counselor. People who have moderate to severe alcohol use disorder may need medical treatment. They may need to stay in a hospital or treatment center. You may have a treatment team to help you. This team may include a psychologist or psychiatrist, counselors, doctors, social workers, nurses, and a . A  helps plan and manage your treatment. Follow-up care is a key part of your treatment and safety. Be sure to make and go to all appointments, and call your doctor if you are having problems. It's also a good idea to know your test results and keep a list of the medicines you take. Where can you learn more?   Go to http://narciso.info/. Enter 070 [id number] [id number] in the search box to learn more about \"Learning About Alcohol Use Disorder. \"  Current as of: February 5, 2019  Content Version: 12.2  © 6103-0633 [employer]. Care instructions adapted under license by to be (which disclaims liability or warranty for this information). If you have questions about a medical condition or this instruction, always ask your healthcare professional. [address] any warranty or liability for your use of this information. Patient Education        Broken Rib: Care Instructions  Your Care Instructions    A broken rib is a crack or break in one of the bones of the rib cage. Breathing can be very painful because the muscles used for breathing pull on the rib. In most cases, a broken rib will heal on its own. You can take pain medicine while the rib mends. Pain relief allows you to take deep breaths. In the past, doctors recommended taping or wrapping broken ribs. This is no longer done because taping makes it hard for you to take deep breaths. Taking deep breaths may help prevent pneumonia or a partial collapse of a lung. Your rib will heal in about 6 weeks. You heal best when you take good care of yourself. Eat a variety of healthy foods, and don't smoke. Follow-up care is a key part of your treatment and safety. Be sure to make and go to all appointments, and call your doctor if you are having problems. It's also a good idea to know your test results and keep a list of the medicines you take. How can you care for yourself at home? · Be safe with medicines. Read and follow all instructions on the label. ? If the doctor gave you a prescription medicine for pain, take it as prescribed. ? If you are not taking a prescription pain medicine, ask your doctor if you can take an over-the-counter medicine.   · Even if it hurts, try to cough or take the deepest breath you can at least once every hour. This will get air deeply into your lungs. This may reduce your chance of getting pneumonia or a partial collapse of a lung. Hold a pillow against your chest to make this less painful. · Put ice or a cold pack on the area for 10 to 20 minutes at a time. Put a thin cloth between the ice and your skin. When should you call for help? Call 911 anytime you think you may need emergency care. For example, call if:    · You have severe trouble breathing.    Call your doctor now or seek immediate medical care if:    · You have some trouble breathing.     · You have a fever.     · You have a new or worse cough.    Watch closely for changes in your health, and be sure to contact your doctor if:    · You have pain even after taking your medicine.     · You do not get better as expected. Where can you learn more? Go to http://akhil-pk.info/. Enter M135 in the search box to learn more about \"Broken Rib: Care Instructions. \"  Current as of: June 26, 2019  Content Version: 12.2  © 2992-1407 [employer]. Care instructions adapted under license by [employer] (which disclaims liability or warranty for this information). If you have questions about a medical condition or this instruction, always ask your healthcare professional. [address] any warranty or liability for your use of this information.

## 2019-10-25 NOTE — ED NOTES
Patient given discharge instructions by provider.     Patient given incentive spirometer instructions and demonstrated use of incentive spirometer

## 2019-10-26 VITALS
SYSTOLIC BLOOD PRESSURE: 141 MMHG | OXYGEN SATURATION: 98 % | TEMPERATURE: 99 F | DIASTOLIC BLOOD PRESSURE: 81 MMHG | HEART RATE: 70 BPM | RESPIRATION RATE: 16 BRPM

## 2019-10-26 LAB
ALBUMIN SERPL-MCNC: 4.1 G/DL (ref 3.4–5)
ALBUMIN/GLOB SERPL: 1 {RATIO} (ref 0.8–1.7)
ALP SERPL-CCNC: 91 U/L (ref 45–117)
ALT SERPL-CCNC: 37 U/L (ref 16–61)
AMPHET UR QL SCN: NEGATIVE
AST SERPL-CCNC: 42 U/L (ref 10–38)
BARBITURATES UR QL SCN: NEGATIVE
BENZODIAZ UR QL: NEGATIVE
BILIRUB DIRECT SERPL-MCNC: 0.3 MG/DL (ref 0–0.2)
BILIRUB SERPL-MCNC: 1.7 MG/DL (ref 0.2–1)
CANNABINOIDS UR QL SCN: POSITIVE
COCAINE UR QL SCN: NEGATIVE
GLOBULIN SER CALC-MCNC: 4.1 G/DL (ref 2–4)
HDSCOM,HDSCOM: ABNORMAL
METHADONE UR QL: NEGATIVE
OPIATES UR QL: NEGATIVE
PCP UR QL: NEGATIVE
PROT SERPL-MCNC: 8.2 G/DL (ref 6.4–8.2)

## 2019-10-26 PROCEDURE — 80076 HEPATIC FUNCTION PANEL: CPT

## 2019-10-26 NOTE — ED NOTES
Called Vladimir Haywood in Crisis after receiving a phone call from a  who told me that this patient was supposed to be at [address] [place] [address] this morning at 0930. I explained to the  that I was never told any information regarding having this patient go to [facility]. Vladimir Haywood told me she knew nothing about the patient being transferred out. I told her we have no excepting physician to do the On license of [facility] on. She states she will call me back with what she finds out.

## 2019-10-26 NOTE — ED NOTES
Attempted to call report on pt. RN unable to take report at this time. Facility stated they will call back to receive report.

## 2019-10-26 NOTE — ED NOTES
TRANSFER - OUT REPORT:    Verbal report given to Alyssa Peterson RN(name) on Shavonne Rivera  being transferred to [facility] [id number]) for routine progression of care       Report consisted of patients Situation, Background, Assessment and   Recommendations(SBAR). Information from the following report(s) SBAR, ED Summary, Intake/Output, MAR, Recent Results and Med Rec Status was reviewed with the receiving nurse. Lines:       Opportunity for questions and clarification was provided.       Patient transported with:  [address]

## 2019-10-26 NOTE — ED NOTES
Patient laying in bed alert and oriented x3, requesting breakfast. Informed patient that I have ordered him a breakfast, but he needs to wait. Patient states his ribs are hurting and I educated him on the fact that when you have broken ribs they are going to hurt with every breath. He was again instructed on the use of the incentive spirometer. Patient acknowledged understanding.

## 2019-10-26 NOTE — BSMART NOTE
Spoke to [place] clinician, states client is accepted for admission at [facility] by Tamiko Gonzalez to arrive at 5 pm today, Saturday 10/26/19. Report number: [id number].

## 2019-10-26 NOTE — ED NOTES
43-year-old gentleman turned over to me 7 AM.  Patient has suicidal ideation denies homicidal ideation. Crisis stabilization requested labs. General; AOX3. Pulmonary; CTA-B. Cardiac: RRR no MRG; Abd S/NT/ND.  Plan:  transfer

## 2019-10-26 NOTE — BSMART NOTE
44 yo male pt interviewed in ED room H1/A at the request of Dr Gabo Armendariz. Pt resting on stretcher upon my arrival. Reports coming to the ED because he has no where to go and is suicidal. Still endorsing s/i with a plan to jump into traffic. Denies h/i a/vh. Reports drinking daily. Last drink today prior to coming to ED earlier in the day as this is his second visit today. Pt denies current drug use. States he has no support at this time. Reports feeling hopeless and helpless. \"I feel like I am carrying the weight of the world and its crashing down on me. I don't want to live with this feeling. I have tried to kill myself before when I was having feelings like this. \" denies legal issues. Does reports having nowhere to go tonight if discharged. Pt would like inpatient psychiatric treatment to help with his suicidal thoughts and homelessness. Complains of pain in the ribs. No other issues voiced or noted at this time. Pt will be referred to [facility] for possible [facility] placement.

## 2024-10-20 ENCOUNTER — APPOINTMENT (OUTPATIENT)
Facility: HOSPITAL | Age: 49
End: 2024-10-20
Payer: COMMERCIAL

## 2024-10-20 ENCOUNTER — HOSPITAL ENCOUNTER (EMERGENCY)
Facility: HOSPITAL | Age: 49
Discharge: HOME | End: 2024-10-20
Payer: COMMERCIAL

## 2024-10-20 VITALS
OXYGEN SATURATION: 97 % | RESPIRATION RATE: 18 BRPM | HEART RATE: 87 BPM | SYSTOLIC BLOOD PRESSURE: 143 MMHG | DIASTOLIC BLOOD PRESSURE: 87 MMHG | WEIGHT: 222 LBS | BODY MASS INDEX: 25.69 KG/M2 | HEIGHT: 78 IN | TEMPERATURE: 98.1 F

## 2024-10-20 DIAGNOSIS — S22.42XA CLOSED FRACTURE OF MULTIPLE RIBS OF LEFT SIDE, INITIAL ENCOUNTER: Primary | ICD-10-CM

## 2024-10-20 PROCEDURE — 99283 EMERGENCY DEPT VISIT LOW MDM: CPT

## 2024-10-20 PROCEDURE — 71101 X-RAY EXAM UNILAT RIBS/CHEST: CPT

## 2024-10-20 PROCEDURE — 6370000000 HC RX 637 (ALT 250 FOR IP)

## 2024-10-20 RX ORDER — OXYCODONE AND ACETAMINOPHEN 5; 325 MG/1; MG/1
1 TABLET ORAL EVERY 6 HOURS PRN
Qty: 20 TABLET | Refills: 0 | Status: SHIPPED | OUTPATIENT
Start: 2024-10-20 | End: 2024-10-25

## 2024-10-20 RX ORDER — OXYCODONE AND ACETAMINOPHEN 5; 325 MG/1; MG/1
1 TABLET ORAL
Status: COMPLETED | OUTPATIENT
Start: 2024-10-20 | End: 2024-10-20

## 2024-10-20 RX ADMIN — OXYCODONE HYDROCHLORIDE AND ACETAMINOPHEN 1 TABLET: 5; 325 TABLET ORAL at 14:28

## 2024-10-20 ASSESSMENT — PAIN SCALES - GENERAL
PAINLEVEL_OUTOF10: 10
PAINLEVEL_OUTOF10: 10

## 2024-10-20 ASSESSMENT — PAIN DESCRIPTION - ORIENTATION
ORIENTATION: LEFT
ORIENTATION: LEFT

## 2024-10-20 ASSESSMENT — PAIN DESCRIPTION - LOCATION
LOCATION: RIB CAGE
LOCATION: RIB CAGE

## 2024-10-20 ASSESSMENT — PAIN - FUNCTIONAL ASSESSMENT: PAIN_FUNCTIONAL_ASSESSMENT: 0-10

## 2024-10-20 NOTE — ED PROVIDER NOTES
systems was reviewed and negative.       PAST MEDICAL HISTORY   No past medical history on file.      SURGICAL HISTORY     No past surgical history on file.      CURRENT MEDICATIONS       Discharge Medication List as of 10/20/2024  2:35 PM          ALLERGIES     Patient has no known allergies.    FAMILY HISTORY       Family History   Problem Relation Age of Onset    Diabetes Brother     Diabetes Mother     Diabetes Sister           SOCIAL HISTORY       Social History     Socioeconomic History    Marital status: Single   Tobacco Use    Smoking status: Every Day     Current packs/day: 0.50     Types: Cigarettes    Smokeless tobacco: Current   Substance and Sexual Activity    Alcohol use: Yes     Alcohol/week: 16.0 standard drinks of alcohol    Drug use: Yes     Types: Marijuana (Weed)       SCREENINGS         [place] Coma Scale  Eye Opening: Spontaneous  Best Verbal Response: Oriented  Best Motor Response: Obeys commands  [place] Coma Scale Score: 15                     CIWA Assessment  BP: (!) 143/87  Pulse: 87                 PHYSICAL EXAM       ED Triage Vitals [10/20/24 1249]   BP Systolic BP Percentile Diastolic BP Percentile Temp Temp Source Pulse Respirations SpO2   (!) 143/87 -- -- 98.1 °F (36.7 °C) Oral 87 18 97 %      Height Weight - Scale         2.007 m (6' 7\") 100.7 kg (222 lb)             Physical Exam  Vitals and nursing note reviewed.   Constitutional:       General: He is not in acute distress.     Appearance: Normal appearance. He is normal weight. He is not ill-appearing or toxic-appearing.   HENT:      Head: Normocephalic and atraumatic.      Right Ear: Tympanic membrane and ear canal normal.      Left Ear: Tympanic membrane and ear canal normal.      Nose: Nose normal.      Mouth/Throat:      Mouth: Mucous membranes are moist.      Pharynx: Oropharynx is clear. No oropharyngeal exudate or posterior oropharyngeal erythema.   Eyes:      General: No scleral icterus.     Extraocular Movements:

## 2024-10-20 NOTE — ED TRIAGE NOTES
Patient presented to the Emergency Dept via EMS  with a complaint of fell on porch last night and states\" think I broke my left rib\"    Patient rate lizeth 10/10 on pain scale     Patient denies LOC, head injury or blood thinner    Patient alert and oriented x 4, patient breathes freely on room air in nil cardiopulmonary distress

## 2024-10-20 NOTE — DISCHARGE INSTRUCTIONS
Call PCP for f/u in office within 1 week.   Use incentive spirometer as instructed.  Take pain medication as needed for pain control. May cause drowsiness.   Return to ED for new or worsening/ concerning symptoms

## 2024-10-21 ASSESSMENT — ENCOUNTER SYMPTOMS: STRIDOR: 0
